# Patient Record
Sex: MALE | Race: WHITE | HISPANIC OR LATINO | Employment: OTHER | URBAN - METROPOLITAN AREA
[De-identification: names, ages, dates, MRNs, and addresses within clinical notes are randomized per-mention and may not be internally consistent; named-entity substitution may affect disease eponyms.]

---

## 2019-06-26 ENCOUNTER — OFFICE VISIT (OUTPATIENT)
Dept: URGENT CARE | Facility: CLINIC | Age: 79
End: 2019-06-26

## 2019-06-26 VITALS
BODY MASS INDEX: 17.72 KG/M2 | RESPIRATION RATE: 16 BRPM | TEMPERATURE: 97.7 F | DIASTOLIC BLOOD PRESSURE: 78 MMHG | WEIGHT: 100 LBS | SYSTOLIC BLOOD PRESSURE: 124 MMHG | OXYGEN SATURATION: 100 % | HEART RATE: 74 BPM | HEIGHT: 63 IN

## 2019-06-26 DIAGNOSIS — R11.10 INTRACTABLE VOMITING, PRESENCE OF NAUSEA NOT SPECIFIED, UNSPECIFIED VOMITING TYPE: ICD-10-CM

## 2019-06-26 PROCEDURE — 99202 OFFICE O/P NEW SF 15 MIN: CPT | Performed by: NURSE PRACTITIONER

## 2019-06-26 RX ORDER — ENALAPRIL MALEATE 10 MG/1
10 TABLET ORAL DAILY
COMMUNITY

## 2019-06-26 RX ORDER — QUETIAPINE FUMARATE 25 MG/1
25 TABLET, FILM COATED ORAL 2 TIMES DAILY
COMMUNITY

## 2019-06-26 ASSESSMENT — ENCOUNTER SYMPTOMS
BACK PAIN: 0
SHORTNESS OF BREATH: 0
ABDOMINAL PAIN: 0
MYALGIAS: 0
WHEEZING: 0
FEVER: 0
NECK PAIN: 0
VOMITING: 0
NEUROLOGICAL NEGATIVE: 1
EYES NEGATIVE: 1
CARDIOVASCULAR NEGATIVE: 1
DIZZINESS: 0
HEARTBURN: 0
CHILLS: 0
CONSTIPATION: 0
HEADACHES: 0
NAUSEA: 1
RESPIRATORY NEGATIVE: 1
FLANK PAIN: 0
DIARRHEA: 0
BLOOD IN STOOL: 0

## 2019-06-26 NOTE — PROGRESS NOTES
Subjective:   Arian Hook is a 78 y.o. male who presents for Emesis (started this morning, vomited after breakfast, has not been able to eat since, lethargic)        HPI   Patient with new onset vomiting that started today. States symptoms have been intermittent and waxing/waning. States drank glass of water and vomited in the bathroom. Denies fever, chills or diarrhea. States with mild dizziness earlier, but has subsided. States earlier when they took BP it was elevated 162s. Patient states he is feeling improved now in office. Denies bloody or dark tarry emesis.  Family member is stating with history of shunt/drainage (which Im assuming is a  shunt)? But they are unsure of what for.  Patient is nonverbal in office and family member is providing history.    Accompanied by several family members in office.    Review of Systems   Constitutional: Negative for chills and fever.   Eyes: Negative.    Respiratory: Negative.  Negative for shortness of breath and wheezing.    Cardiovascular: Negative.  Negative for chest pain.   Gastrointestinal: Positive for nausea. Negative for abdominal pain, blood in stool, constipation, diarrhea, heartburn, melena and vomiting.   Genitourinary: Negative for dysuria, flank pain, frequency, hematuria and urgency.   Musculoskeletal: Negative for back pain, myalgias and neck pain.   Skin: Negative.    Neurological: Negative.  Negative for dizziness and headaches.     PMH:  has no past medical history on file.  MEDS:   Current Outpatient Prescriptions:   •  enalapril (VASOTEC) 10 MG Tab, Take 10 mg by mouth every day., Disp: , Rfl:   •  QUEtiapine (SEROQUEL) 25 MG Tab, Take 25 mg by mouth 2 times a day., Disp: , Rfl:   ALLERGIES: No Known Allergies  SURGHX: History reviewed. No pertinent surgical history.  SOCHX:  reports that he has never smoked. He has never used smokeless tobacco. He reports that he does not drink alcohol or use drugs.  FH: Family history was reviewed, no pertinent  "findings to report     Objective:   /78   Pulse 74   Temp 36.5 °C (97.7 °F)   Resp 16   Ht 1.6 m (5' 3\")   Wt 45.4 kg (100 lb)   SpO2 100%   BMI 17.71 kg/m²   Physical Exam   Constitutional: He is oriented to person, place, and time. He appears well-developed and well-nourished. No distress.   HENT:   Right Ear: Hearing normal.   Left Ear: Hearing normal.   Mouth/Throat: Oropharynx is clear and moist and mucous membranes are normal.   Eyes: Pupils are equal, round, and reactive to light. Conjunctivae are normal.   Cardiovascular: Normal rate, regular rhythm and normal heart sounds.    No murmur heard.  Pulmonary/Chest: Effort normal and breath sounds normal. No respiratory distress.   Abdominal: Soft. Normal appearance and bowel sounds are normal. He exhibits no distension and no ascites. There is no hepatosplenomegaly. There is tenderness in the right upper quadrant. There is no CVA tenderness.   Neurological: He is alert and oriented to person, place, and time.   Skin: Skin is warm and dry. Capillary refill takes less than 2 seconds. No rash noted. He is not diaphoretic.   Psychiatric: He has a normal mood and affect. His speech is normal and behavior is normal. Judgment and thought content normal.   Vitals reviewed.        Assessment/Plan:   Assessment    1. Intractable vomiting, presence of nausea not specified, unspecified vomiting type    Other orders  - enalapril (VASOTEC) 10 MG Tab; Take 10 mg by mouth every day.  - QUEtiapine (SEROQUEL) 25 MG Tab; Take 25 mg by mouth 2 times a day.    Discussed with family that since with RUQ tenderness, would like to order ultrasound of abdomen. They state that they would like to monitor at home and see how patient feels and if worsening either go to ER or will call in the morning and I will order an ultrasound abdominal. Instructed to push fluids, including electrolytes.  Discussed to take BP at home and if is above 160 systolic, they are to go to ER " immediately.  Discussed signs and symptoms of when to go to ER; strict ER precautions discussed    Differential diagnosis, natural history, supportive care, and indications for immediate follow-up discussed.

## 2021-01-15 DIAGNOSIS — Z23 NEED FOR VACCINATION: ICD-10-CM
